# Patient Record
Sex: MALE | Race: BLACK OR AFRICAN AMERICAN | NOT HISPANIC OR LATINO | URBAN - METROPOLITAN AREA
[De-identification: names, ages, dates, MRNs, and addresses within clinical notes are randomized per-mention and may not be internally consistent; named-entity substitution may affect disease eponyms.]

---

## 2023-07-10 ASSESSMENT — ENCOUNTER SYMPTOMS
POLYPHAGIA: 0
FATIGUE: 0
HUNGER: 0
HEADACHES: 0
CONFUSION: 0
BLACKOUTS: 0
SEIZURES: 0
NERVOUS/ANXIOUS: 0
WEIGHT LOSS: 0
SWEATS: 0
TREMORS: 0
BLURRED VISION: 0
VISUAL CHANGE: 0
DIZZINESS: 0
SPEECH DIFFICULTY: 0
POLYDIPSIA: 0
WEAKNESS: 0

## 2023-07-11 ENCOUNTER — OFFICE VISIT (OUTPATIENT)
Dept: PRIMARY CARE | Facility: CLINIC | Age: 53
End: 2023-07-11
Payer: COMMERCIAL

## 2023-07-11 VITALS
HEIGHT: 66 IN | TEMPERATURE: 98 F | RESPIRATION RATE: 18 BRPM | HEART RATE: 86 BPM | BODY MASS INDEX: 45.06 KG/M2 | OXYGEN SATURATION: 95 % | SYSTOLIC BLOOD PRESSURE: 143 MMHG | DIASTOLIC BLOOD PRESSURE: 106 MMHG | WEIGHT: 280.4 LBS

## 2023-07-11 DIAGNOSIS — Z86.39 HX OF HYPERLIPIDEMIA: ICD-10-CM

## 2023-07-11 DIAGNOSIS — E11.9 TYPE 2 DIABETES MELLITUS WITHOUT COMPLICATION, WITHOUT LONG-TERM CURRENT USE OF INSULIN (MULTI): Primary | ICD-10-CM

## 2023-07-11 DIAGNOSIS — I10 HYPERTENSION, UNSPECIFIED TYPE: ICD-10-CM

## 2023-07-11 DIAGNOSIS — E66.01 MORBID OBESITY WITH BMI OF 45.0-49.9, ADULT (MULTI): ICD-10-CM

## 2023-07-11 PROCEDURE — 3080F DIAST BP >= 90 MM HG: CPT | Performed by: NURSE PRACTITIONER

## 2023-07-11 PROCEDURE — 3077F SYST BP >= 140 MM HG: CPT | Performed by: NURSE PRACTITIONER

## 2023-07-11 PROCEDURE — 4010F ACE/ARB THERAPY RXD/TAKEN: CPT | Performed by: NURSE PRACTITIONER

## 2023-07-11 PROCEDURE — 3008F BODY MASS INDEX DOCD: CPT | Performed by: NURSE PRACTITIONER

## 2023-07-11 PROCEDURE — 82043 UR ALBUMIN QUANTITATIVE: CPT

## 2023-07-11 PROCEDURE — 1036F TOBACCO NON-USER: CPT | Performed by: NURSE PRACTITIONER

## 2023-07-11 PROCEDURE — 99214 OFFICE O/P EST MOD 30 MIN: CPT | Performed by: NURSE PRACTITIONER

## 2023-07-11 PROCEDURE — 82570 ASSAY OF URINE CREATININE: CPT

## 2023-07-11 RX ORDER — LISINOPRIL 10 MG/1
10 TABLET ORAL DAILY
COMMUNITY
End: 2023-07-11 | Stop reason: SDUPTHER

## 2023-07-11 RX ORDER — CETIRIZINE HYDROCHLORIDE 10 MG/1
TABLET ORAL
COMMUNITY
Start: 2017-10-02

## 2023-07-11 RX ORDER — LISINOPRIL 10 MG/1
10 TABLET ORAL DAILY
Qty: 30 TABLET | Refills: 5 | Status: SHIPPED | OUTPATIENT
Start: 2023-07-11 | End: 2024-01-09 | Stop reason: SDUPTHER

## 2023-07-11 RX ORDER — ATORVASTATIN CALCIUM 10 MG/1
10 TABLET, FILM COATED ORAL DAILY
COMMUNITY
End: 2023-07-11 | Stop reason: SDUPTHER

## 2023-07-11 RX ORDER — GLIMEPIRIDE 2 MG/1
TABLET ORAL
Qty: 30 TABLET | Refills: 5 | Status: SHIPPED | OUTPATIENT
Start: 2023-07-11 | End: 2024-01-09 | Stop reason: SDUPTHER

## 2023-07-11 RX ORDER — ATORVASTATIN CALCIUM 10 MG/1
10 TABLET, FILM COATED ORAL DAILY
Qty: 30 TABLET | Refills: 5 | Status: SHIPPED | OUTPATIENT
Start: 2023-07-11 | End: 2024-01-09 | Stop reason: SDUPTHER

## 2023-07-11 RX ORDER — BLOOD-GLUCOSE METER
EACH MISCELLANEOUS
COMMUNITY
Start: 2021-03-08 | End: 2024-01-09 | Stop reason: SDUPTHER

## 2023-07-11 RX ORDER — GLIMEPIRIDE 2 MG/1
TABLET ORAL
COMMUNITY
End: 2023-07-11 | Stop reason: SDUPTHER

## 2023-07-11 RX ORDER — SILDENAFIL 100 MG/1
TABLET, FILM COATED ORAL
COMMUNITY
Start: 2017-07-14 | End: 2024-01-09 | Stop reason: SDUPTHER

## 2023-07-11 RX ORDER — METFORMIN HYDROCHLORIDE 1000 MG/1
1000 TABLET ORAL 2 TIMES DAILY
Qty: 60 TABLET | Refills: 5 | Status: SHIPPED | OUTPATIENT
Start: 2023-07-11 | End: 2024-01-09 | Stop reason: SDUPTHER

## 2023-07-11 RX ORDER — METFORMIN HYDROCHLORIDE 1000 MG/1
1000 TABLET ORAL 2 TIMES DAILY
COMMUNITY
End: 2023-07-11 | Stop reason: SDUPTHER

## 2023-07-11 ASSESSMENT — ENCOUNTER SYMPTOMS
NERVOUS/ANXIOUS: 0
APNEA: 0
SPEECH DIFFICULTY: 0
WEAKNESS: 0
COUGH: 0
FATIGUE: 0
HEADACHES: 0
SHORTNESS OF BREATH: 0
DIZZINESS: 0
POLYDIPSIA: 0
STRIDOR: 0
SEIZURES: 0
POLYPHAGIA: 0
PALPITATIONS: 0
CHEST TIGHTNESS: 0
CHOKING: 0
TREMORS: 0
NAUSEA: 0
VOMITING: 0
CONFUSION: 0
WHEEZING: 0

## 2023-07-11 NOTE — PROGRESS NOTES
"Subjective   Patient ID: Jer Silva is a 53 y.o. male who presents for Follow-up (6 months, refills).    HPI  Patient in office for follow-up on hypertension, hyperlipidemia, and diabetes. Needs blood pressure check, A1C recheck (fasted for labs), and medication refill (except Viagra). No other concerns.    AM fasting glucose levels at home: 90 to 110  BP: borderline; patient will start checking at home and call the office with outliers    Sees ophthalmologist regularly.   Monitors his feet for lesions.    The patient declines PSA recheck.     Review of Systems   Constitutional:  Negative for fatigue.   Respiratory:  Negative for apnea, cough, choking, chest tightness, shortness of breath, wheezing and stridor.    Cardiovascular:  Negative for chest pain, palpitations and leg swelling.   Gastrointestinal:  Negative for nausea and vomiting.   Endocrine: Negative for polydipsia, polyphagia and polyuria.   Skin:  Negative for pallor.   Neurological:  Negative for dizziness, tremors, seizures, speech difficulty, weakness and headaches.   Psychiatric/Behavioral:  Negative for confusion. The patient is not nervous/anxious.        Objective   BP (!) 143/106   Pulse 86   Temp 36.7 °C (98 °F)   Resp 18   Ht 1.676 m (5' 6\")   Wt 127 kg (280 lb 6.4 oz)   SpO2 95%   BMI 45.26 kg/m²     Repeat BP: 136/88 (manual)    Physical Exam  Constitutional:       Appearance: He is obese.   Eyes:      Conjunctiva/sclera: Conjunctivae normal.   Cardiovascular:      Rate and Rhythm: Normal rate and regular rhythm.      Pulses: Normal pulses.      Heart sounds: Normal heart sounds.   Pulmonary:      Effort: Pulmonary effort is normal.      Breath sounds: Normal breath sounds.   Musculoskeletal:      Right lower leg: No edema.      Left lower leg: No edema.   Neurological:      General: No focal deficit present.   Psychiatric:         Mood and Affect: Mood normal.         Judgment: Judgment normal.       Assessment/Plan     Exam " findings reviewed with patient. Medications and lab orders reviewed. Patient will keep monitoring blood sugars and will start monitoring blood pressures at home; he will call the office with outliers or abnormal trends. We will call with lab results and update the patient on the plan of care. Follow up in 6 months for a physical or earlier as needed.    Benefits of healthy lifestyle reviewed: low carbohydrates/fat/sugar diet; use lean white instead of red meet; use several servings of fruit and vegetables daily; use whole grain flour instead of white flour products; cook at home frequently instead of eating out; exercise 30-90 minutes 5 x/week.

## 2023-07-11 NOTE — PROGRESS NOTES
Answers submitted by the patient for this visit:  Diabetes Questionnaire (Submitted on 7/10/2023)  Chief Complaint: Diabetes problem  Diabetes type: type 2  MedicAlert ID: No  Disease duration: 6 Years  blurred vision: No  chest pain: No  fatigue: No  foot paresthesias: No  foot ulcerations: No  polydipsia: No  polyphagia: No  polyuria: No  visual change: No  weakness: No  weight loss: No  Symptom course: resolved  confusion: No  dizziness: No  headaches: No  hunger: No  mood changes: No  nervous/anxious: No  pallor: No  seizures: No  sleepiness: No  speech difficulty: No  sweats: No  tremors: No  blackouts: No  hospitalization: No  nocturnal hypoglycemia: No  required assistance: No  required glucagon: No  CVA: No  heart disease: No  impotence: Yes  nephropathy: No  peripheral neuropathy: No  PVD: No  retinopathy: No  CAD risks: obesity  Current treatments: diet, oral agent (monotherapy), oral agent (dual therapy)  Treatment compliance: all of the time  Home blood tests: 1-2 x per week  Monitoring compliance: excellent  Blood glucose trend: no change  breakfast time: 5-6 am  breakfast glucose level:   High score:   Weight trend: fluctuating minimally  Current diet: generally healthy  Meal planning: none  Exercise: intermittently  Dietitian visit: No  Eye exam current: No  Sees podiatrist: No

## 2023-07-12 LAB
ALBUMIN (MG/L) IN URINE: 18 MG/L
ALBUMIN/CREATININE (UG/MG) IN URINE: 5.7 UG/MG CRT (ref 0–30)
CREATININE (MG/DL) IN URINE: 318 MG/DL (ref 20–370)

## 2023-07-17 PROBLEM — L30.9 ECZEMA: Status: ACTIVE | Noted: 2023-07-17

## 2023-07-17 PROBLEM — I10 HTN (HYPERTENSION): Status: ACTIVE | Noted: 2023-07-17

## 2023-07-17 PROBLEM — D22.9 MULTIPLE NEVI: Status: ACTIVE | Noted: 2023-07-17

## 2023-07-17 PROBLEM — E78.00 HIGH CHOLESTEROL: Status: ACTIVE | Noted: 2023-07-17

## 2023-07-17 PROBLEM — D64.9 ANEMIA: Status: ACTIVE | Noted: 2023-06-02

## 2023-07-17 PROBLEM — E11.9 DIABETES MELLITUS, TYPE 2 (MULTI): Status: ACTIVE | Noted: 2017-10-02

## 2023-07-17 PROBLEM — D56.9 THALASSEMIA: Status: ACTIVE | Noted: 2023-06-02

## 2023-07-17 PROBLEM — N52.9 ERECTILE DYSFUNCTION: Status: ACTIVE | Noted: 2023-07-17

## 2024-01-03 ASSESSMENT — PROMIS GLOBAL HEALTH SCALE
CARRYOUT_PHYSICAL_ACTIVITIES: COMPLETELY
CARRYOUT_SOCIAL_ACTIVITIES: EXCELLENT
RATE_AVERAGE_PAIN: 0
RATE_QUALITY_OF_LIFE: VERY GOOD
RATE_AVERAGE_FATIGUE: MILD
RATE_GENERAL_HEALTH: GOOD
RATE_SOCIAL_SATISFACTION: EXCELLENT
RATE_MENTAL_HEALTH: VERY GOOD
RATE_PHYSICAL_HEALTH: GOOD
EMOTIONAL_PROBLEMS: RARELY

## 2024-01-05 ENCOUNTER — APPOINTMENT (OUTPATIENT)
Dept: PRIMARY CARE | Facility: CLINIC | Age: 54
End: 2024-01-05
Payer: COMMERCIAL

## 2024-01-08 ASSESSMENT — PROMIS GLOBAL HEALTH SCALE
RATE_AVERAGE_FATIGUE: MILD
CARRYOUT_PHYSICAL_ACTIVITIES: COMPLETELY
RATE_MENTAL_HEALTH: VERY GOOD
RATE_SOCIAL_SATISFACTION: VERY GOOD
EMOTIONAL_PROBLEMS: NEVER
RATE_PHYSICAL_HEALTH: GOOD
RATE_AVERAGE_PAIN: 0
CARRYOUT_SOCIAL_ACTIVITIES: VERY GOOD
RATE_GENERAL_HEALTH: GOOD
RATE_QUALITY_OF_LIFE: VERY GOOD

## 2024-01-09 ENCOUNTER — OFFICE VISIT (OUTPATIENT)
Dept: PRIMARY CARE | Facility: CLINIC | Age: 54
End: 2024-01-09
Payer: COMMERCIAL

## 2024-01-09 VITALS
OXYGEN SATURATION: 96 % | WEIGHT: 273 LBS | TEMPERATURE: 97.3 F | BODY MASS INDEX: 42.85 KG/M2 | HEIGHT: 67 IN | DIASTOLIC BLOOD PRESSURE: 91 MMHG | RESPIRATION RATE: 18 BRPM | SYSTOLIC BLOOD PRESSURE: 130 MMHG | HEART RATE: 93 BPM

## 2024-01-09 DIAGNOSIS — I10 HYPERTENSION, UNSPECIFIED TYPE: ICD-10-CM

## 2024-01-09 DIAGNOSIS — D56.9 THALASSEMIA, UNSPECIFIED TYPE: ICD-10-CM

## 2024-01-09 DIAGNOSIS — Z12.5 SCREENING FOR PROSTATE CANCER: ICD-10-CM

## 2024-01-09 DIAGNOSIS — E11.9 TYPE 2 DIABETES MELLITUS WITHOUT COMPLICATION, WITHOUT LONG-TERM CURRENT USE OF INSULIN (MULTI): ICD-10-CM

## 2024-01-09 DIAGNOSIS — D64.9 ANEMIA, UNSPECIFIED TYPE: ICD-10-CM

## 2024-01-09 DIAGNOSIS — Z00.00 HEALTHCARE MAINTENANCE: Primary | ICD-10-CM

## 2024-01-09 DIAGNOSIS — D22.9 MULTIPLE NEVI: ICD-10-CM

## 2024-01-09 DIAGNOSIS — Z86.39 HX OF HYPERLIPIDEMIA: ICD-10-CM

## 2024-01-09 DIAGNOSIS — N52.9 ERECTILE DYSFUNCTION, UNSPECIFIED ERECTILE DYSFUNCTION TYPE: ICD-10-CM

## 2024-01-09 PROCEDURE — 3080F DIAST BP >= 90 MM HG: CPT | Performed by: NURSE PRACTITIONER

## 2024-01-09 PROCEDURE — 82728 ASSAY OF FERRITIN: CPT

## 2024-01-09 PROCEDURE — 80061 LIPID PANEL: CPT

## 2024-01-09 PROCEDURE — 1036F TOBACCO NON-USER: CPT | Performed by: NURSE PRACTITIONER

## 2024-01-09 PROCEDURE — 84153 ASSAY OF PSA TOTAL: CPT

## 2024-01-09 PROCEDURE — 82607 VITAMIN B-12: CPT

## 2024-01-09 PROCEDURE — 84443 ASSAY THYROID STIM HORMONE: CPT

## 2024-01-09 PROCEDURE — 3075F SYST BP GE 130 - 139MM HG: CPT | Performed by: NURSE PRACTITIONER

## 2024-01-09 PROCEDURE — 3008F BODY MASS INDEX DOCD: CPT | Performed by: NURSE PRACTITIONER

## 2024-01-09 PROCEDURE — 80053 COMPREHEN METABOLIC PANEL: CPT

## 2024-01-09 PROCEDURE — 83550 IRON BINDING TEST: CPT

## 2024-01-09 PROCEDURE — 83036 HEMOGLOBIN GLYCOSYLATED A1C: CPT

## 2024-01-09 PROCEDURE — 99396 PREV VISIT EST AGE 40-64: CPT | Performed by: NURSE PRACTITIONER

## 2024-01-09 PROCEDURE — 83540 ASSAY OF IRON: CPT

## 2024-01-09 PROCEDURE — 36415 COLL VENOUS BLD VENIPUNCTURE: CPT

## 2024-01-09 PROCEDURE — 4010F ACE/ARB THERAPY RXD/TAKEN: CPT | Performed by: NURSE PRACTITIONER

## 2024-01-09 PROCEDURE — 85025 COMPLETE CBC W/AUTO DIFF WBC: CPT

## 2024-01-09 RX ORDER — GLIMEPIRIDE 2 MG/1
TABLET ORAL
Qty: 30 TABLET | Refills: 5 | Status: SHIPPED | OUTPATIENT
Start: 2024-01-09

## 2024-01-09 RX ORDER — SILDENAFIL 100 MG/1
TABLET, FILM COATED ORAL
Qty: 10 TABLET | Refills: 5 | Status: SHIPPED | OUTPATIENT
Start: 2024-01-09

## 2024-01-09 RX ORDER — LISINOPRIL 10 MG/1
10 TABLET ORAL DAILY
Qty: 30 TABLET | Refills: 5 | Status: SHIPPED | OUTPATIENT
Start: 2024-01-09 | End: 2025-01-08

## 2024-01-09 RX ORDER — SILDENAFIL 100 MG/1
TABLET, FILM COATED ORAL
Qty: 10 TABLET | Status: CANCELLED | OUTPATIENT
Start: 2024-01-09

## 2024-01-09 RX ORDER — BLOOD-GLUCOSE METER
EACH MISCELLANEOUS
Qty: 100 STRIP | Refills: 3 | Status: SHIPPED | OUTPATIENT
Start: 2024-01-09

## 2024-01-09 RX ORDER — ATORVASTATIN CALCIUM 10 MG/1
10 TABLET, FILM COATED ORAL DAILY
Qty: 30 TABLET | Refills: 5 | Status: SHIPPED | OUTPATIENT
Start: 2024-01-09 | End: 2025-01-08

## 2024-01-09 RX ORDER — METFORMIN HYDROCHLORIDE 1000 MG/1
1000 TABLET ORAL 2 TIMES DAILY
Qty: 60 TABLET | Refills: 5 | Status: SHIPPED | OUTPATIENT
Start: 2024-01-09 | End: 2025-01-08

## 2024-01-09 NOTE — PROGRESS NOTES
Subjective   Patient ID: Jer Silva is a 53 y.o. male who presents for Annual Exam.    HPI   Patient in office for a physical. Patient describes health as good.      Lifestyle: [employer]. Reports healthy diet. Patient does not smoke; does not drink alcohol. No drug use.      Screens and immunizations:  Colon Cancer Screening: Reviewed and current. 8/2021; order Cologuard in 8/2024  Prostate Cancer Screening: order PSA  Vision and hearing: Reviewed and current.     Immunizations:  TDaP: 2021  Pneumonia: 2017  Hep B:  vaccinated (per patient report)  Flu: patient will get booster at pharmacy  COVID: patient will get booster at pharmacy   Shingles: patient will get booster at pharmacy    Concerns:  -DM 2; controlled  -HTN; controlled  -Elevated lipids; check labs  -Multiple nevi; patient follows up with dermatology  -BMI: 43; the patient recently restarted a daily exercise routine  -Erectile dysfunction; responds well to Sildenafil  -We will reevaluate risks/benefits of Aspirin use after lab review  -Hx of Thalassemia and mild anemia; check CBC    Review of Systems  Constitutional:  Negative for fatigue, activity change, appetite change, chills, diaphoresis, fever and unexpected weight change. HENT:  Negative for congestion, dental problem, drooling, ear discharge, ear pain, facial swelling, hearing loss, mouth sores, nosebleeds, postnasal drip, rhinorrhea, sinus pressure, sinus pain, sneezing, sore throat, tinnitus, trouble swallowing and voice change. Eyes:  Negative for photophobia, pain, discharge, redness, itching and visual disturbance.   Respiratory:  Negative for apnea, cough, choking, chest tightness, shortness of breath, wheezing and stridor.    Cardiovascular:  Negative for chest pain, palpitations and leg swelling.   Gastrointestinal:  Negative for abdominal distention, abdominal pain, anal bleeding, blood in stool, constipation, diarrhea, nausea, rectal pain and vomiting.   Endocrine: Negative for  "cold intolerance, heat intolerance, polydipsia, polyphagia and polyuria.   Genitourinary:  Negative for decreased urine volume, difficulty urinating, dysuria, enuresis, flank pain, frequency, and urgency.   Musculoskeletal:  Negative for arthralgias, gait problem, joint swelling, myalgias, neck pain and neck stiffness.   Skin:  Negative for color change, pallor, rash and wound.   Neurological:  Negative for dizziness, tremors, seizures, syncope, facial asymmetry, speech difficulty, weakness, light-headedness, numbness and headaches.   Hematological:  Negative for adenopathy. Does not bruise/bleed easily.   Psychiatric/Behavioral:  Negative for depression, agitation, behavioral problems, confusion, hyperactivity, sleep disturbance, hallucinations, self-injury, and suicidal ideas. Negative for anxiety.      Objective   BP (!) 130/91   Pulse 93   Temp 36.3 °C (97.3 °F)   Resp 18   Ht 1.689 m (5' 6.5\")   Wt 124 kg (273 lb)   SpO2 96%   BMI 43.40 kg/m²     Physical Exam  Constitutional:       Appearance: Normal appearance. Obese.   Neurological     Mental Status: He is alert.   HENT:   Right ear: Cerumen impaction.  Left ear: normal: TM and ear canal visualized.  Head: Normocephalic.      Nose: Nose normal.      Mouth/Throat:      Mouth: Mucous membranes are moist.      Pharynx: Oropharynx is clear.   Eyes:      General: No scleral icterus.        Right eye: No discharge.         Left eye: No discharge.      Conjunctiva/sclera: Conjunctivae normal.      Pupils: Pupils are equal, round, and reactive to light.   Neck:      Vascular: No carotid bruit.   Cardiovascular:      Rate and Rhythm: Normal rate and regular rhythm.      Pulses: Normal pulses.      Heart sounds: Normal heart sounds.   Pulmonary:      Effort: Pulmonary effort is normal.      Breath sounds: Normal breath sounds.   Abdominal:      General: Abdomen is flat. Bowel sounds are normal. There is no distension.      Palpations: Abdomen is soft. There is " no mass.      Tenderness: There is no abdominal tenderness. There is no guarding or rebound.      Hernia: No hernia is present.   Musculoskeletal:         General: No swelling, tenderness, deformity or signs of injury.      Cervical back: Normal range of motion and neck supple. No rigidity or tenderness.      Right lower leg: No edema.      Left lower leg: No edema.     Normal ROM.  Lymphadenopathy:      Cervical: No cervical adenopathy.   Skin:     General: Skin is warm.      Coloration: Skin is not jaundiced or pale.      Findings: No bruising, erythema, lesion.     No rash. Multiple nevi.  Neurological:      General: No focal deficit present.      Mental Status: She is alert and oriented to person, place, and time.      Cranial Nerves: No cranial nerve deficit.      Sensory: No sensory deficit.      Motor: No weakness.      Coordination: Coordination normal.      Gait: Gait normal.   Psychiatric:         Mood and Affect: Mood normal.         Behavior: Behavior normal.         Thought Content: Thought content normal.    Judgment: Judgment normal.      Assessment/Plan     Exam findings reviewed with patient.  Health screens, lab orders,  immunizations, medications,   and specialty provider follow-  ups discussed. Benefits of healthy lifestyle, diet, and exercise reviewed. Follow-up in 6 months for a routine visit or earlier as needed.

## 2024-01-10 ENCOUNTER — TELEPHONE (OUTPATIENT)
Dept: PRIMARY CARE | Facility: CLINIC | Age: 54
End: 2024-01-10
Payer: COMMERCIAL

## 2024-01-10 DIAGNOSIS — Z86.39 HX OF HYPERLIPIDEMIA: ICD-10-CM

## 2024-01-10 DIAGNOSIS — E11.9 TYPE 2 DIABETES MELLITUS WITHOUT COMPLICATION, WITHOUT LONG-TERM CURRENT USE OF INSULIN (MULTI): Primary | ICD-10-CM

## 2024-01-10 DIAGNOSIS — I10 HYPERTENSION, UNSPECIFIED TYPE: ICD-10-CM

## 2024-01-10 DIAGNOSIS — D64.9 ANEMIA, UNSPECIFIED TYPE: Primary | ICD-10-CM

## 2024-01-10 DIAGNOSIS — E11.9 TYPE 2 DIABETES MELLITUS WITHOUT COMPLICATION, WITHOUT LONG-TERM CURRENT USE OF INSULIN (MULTI): ICD-10-CM

## 2024-01-10 LAB
ALBUMIN SERPL BCP-MCNC: 4.3 G/DL (ref 3.4–5)
ALP SERPL-CCNC: 76 U/L (ref 33–120)
ALT SERPL W P-5'-P-CCNC: 26 U/L (ref 10–52)
ANION GAP SERPL CALC-SCNC: 13 MMOL/L (ref 10–20)
AST SERPL W P-5'-P-CCNC: 19 U/L (ref 9–39)
BASOPHILS # BLD AUTO: 0.03 X10*3/UL (ref 0–0.1)
BASOPHILS NFR BLD AUTO: 0.5 %
BILIRUB SERPL-MCNC: 0.3 MG/DL (ref 0–1.2)
BUN SERPL-MCNC: 13 MG/DL (ref 6–23)
CALCIUM SERPL-MCNC: 9.8 MG/DL (ref 8.6–10.6)
CHLORIDE SERPL-SCNC: 102 MMOL/L (ref 98–107)
CHOLEST SERPL-MCNC: 121 MG/DL (ref 0–199)
CHOLESTEROL/HDL RATIO: 3
CO2 SERPL-SCNC: 29 MMOL/L (ref 21–32)
CREAT SERPL-MCNC: 1.08 MG/DL (ref 0.5–1.3)
EGFRCR SERPLBLD CKD-EPI 2021: 82 ML/MIN/1.73M*2
EOSINOPHIL # BLD AUTO: 0.07 X10*3/UL (ref 0–0.7)
EOSINOPHIL NFR BLD AUTO: 1.1 %
ERYTHROCYTE [DISTWIDTH] IN BLOOD BY AUTOMATED COUNT: 15.6 % (ref 11.5–14.5)
EST. AVERAGE GLUCOSE BLD GHB EST-MCNC: 134 MG/DL
FERRITIN SERPL-MCNC: 118 NG/ML (ref 20–300)
GLUCOSE SERPL-MCNC: 94 MG/DL (ref 74–99)
HBA1C MFR BLD: 6.3 %
HCT VFR BLD AUTO: 41.5 % (ref 41–52)
HDLC SERPL-MCNC: 39.8 MG/DL
HGB BLD-MCNC: 13 G/DL (ref 13.5–17.5)
IMM GRANULOCYTES # BLD AUTO: 0.01 X10*3/UL (ref 0–0.7)
IMM GRANULOCYTES NFR BLD AUTO: 0.2 % (ref 0–0.9)
IRON SATN MFR SERPL: 25 % (ref 25–45)
IRON SERPL-MCNC: 78 UG/DL (ref 35–150)
LDLC SERPL CALC-MCNC: 71 MG/DL
LYMPHOCYTES # BLD AUTO: 2.73 X10*3/UL (ref 1.2–4.8)
LYMPHOCYTES NFR BLD AUTO: 43.3 %
MCH RBC QN AUTO: 22.3 PG (ref 26–34)
MCHC RBC AUTO-ENTMCNC: 31.3 G/DL (ref 32–36)
MCV RBC AUTO: 71 FL (ref 80–100)
MONOCYTES # BLD AUTO: 0.34 X10*3/UL (ref 0.1–1)
MONOCYTES NFR BLD AUTO: 5.4 %
NEUTROPHILS # BLD AUTO: 3.13 X10*3/UL (ref 1.2–7.7)
NEUTROPHILS NFR BLD AUTO: 49.5 %
NON HDL CHOLESTEROL: 81 MG/DL (ref 0–149)
NRBC BLD-RTO: 0 /100 WBCS (ref 0–0)
PLATELET # BLD AUTO: 300 X10*3/UL (ref 150–450)
POTASSIUM SERPL-SCNC: 4.5 MMOL/L (ref 3.5–5.3)
PROT SERPL-MCNC: 7.6 G/DL (ref 6.4–8.2)
PSA SERPL-MCNC: 2.65 NG/ML
RBC # BLD AUTO: 5.83 X10*6/UL (ref 4.5–5.9)
SODIUM SERPL-SCNC: 139 MMOL/L (ref 136–145)
TIBC SERPL-MCNC: 315 UG/DL (ref 240–445)
TRIGL SERPL-MCNC: 52 MG/DL (ref 0–149)
TSH SERPL-ACNC: 1.35 MIU/L (ref 0.44–3.98)
UIBC SERPL-MCNC: 237 UG/DL (ref 110–370)
VIT B12 SERPL-MCNC: 481 PG/ML (ref 211–911)
VLDL: 10 MG/DL (ref 0–40)
WBC # BLD AUTO: 6.3 X10*3/UL (ref 4.4–11.3)

## 2024-01-10 RX ORDER — METFORMIN HYDROCHLORIDE 1000 MG/1
1000 TABLET ORAL 2 TIMES DAILY
Qty: 60 TABLET | Refills: 5 | OUTPATIENT
Start: 2024-01-10 | End: 2025-01-09

## 2024-01-10 RX ORDER — GLIMEPIRIDE 2 MG/1
TABLET ORAL
Qty: 30 TABLET | Refills: 5 | OUTPATIENT
Start: 2024-01-10

## 2024-01-10 RX ORDER — ATORVASTATIN CALCIUM 10 MG/1
10 TABLET, FILM COATED ORAL DAILY
Qty: 30 TABLET | Refills: 5 | OUTPATIENT
Start: 2024-01-10 | End: 2025-01-09

## 2024-01-10 RX ORDER — LISINOPRIL 10 MG/1
10 TABLET ORAL DAILY
Qty: 30 TABLET | Refills: 5 | OUTPATIENT
Start: 2024-01-10 | End: 2025-01-09

## 2024-01-10 NOTE — TELEPHONE ENCOUNTER
Jihan from [facility] called and asked for refills of the following:    Lisinopril 10 mg 1 tablet daily  Metformin 1000 mg BID  Lipitor 10 mg 1 tablet daily  Atorvastatin 10 mg 1 tablet daily

## 2024-01-10 NOTE — RESULT ENCOUNTER NOTE
Please call the patient. Stable labs. The patient remains slightly anemic. Please ask the patient if he has seen hematology and let me know. Lipids are normal and diabetes is well controlled. I suggest a CT Calcium scan to check for cardiac plaque to determine if the patient should be on Aspirin. Please let me know if the patient agrees, and I will order it. Recheck A1C in 6 months, other labs in 1 year.

## 2024-01-10 NOTE — TELEPHONE ENCOUNTER
I spoke with the patient and he said that he does see a Hematologist at [facility] 2-3 times a year.  He said that she told him that the issue he has is genetic and his Red Blood Cells are smaller than normal and more pale in color.  She said that there isn't anything that can be done for that.    He would like for you to order the CT Calcium Score test.  He said once it is ordered he will schedule an appointment to have it done.  Can you please place the order?  Thank you

## 2024-01-12 ENCOUNTER — APPOINTMENT (OUTPATIENT)
Dept: PRIMARY CARE | Facility: CLINIC | Age: 54
End: 2024-01-12
Payer: COMMERCIAL

## 2024-01-30 ENCOUNTER — HOSPITAL ENCOUNTER (OUTPATIENT)
Dept: RADIOLOGY | Facility: CLINIC | Age: 54
Discharge: HOME | End: 2024-01-30
Payer: COMMERCIAL

## 2024-01-30 DIAGNOSIS — E11.9 TYPE 2 DIABETES MELLITUS WITHOUT COMPLICATION, WITHOUT LONG-TERM CURRENT USE OF INSULIN (MULTI): ICD-10-CM

## 2024-01-30 DIAGNOSIS — I25.10 MILD CORONARY ARTERY DISEASE: ICD-10-CM

## 2024-01-30 DIAGNOSIS — I10 HYPERTENSION, UNSPECIFIED TYPE: ICD-10-CM

## 2024-01-30 DIAGNOSIS — Z86.39 HX OF HYPERLIPIDEMIA: ICD-10-CM

## 2024-01-30 DIAGNOSIS — Z86.39 HX OF HYPERLIPIDEMIA: Primary | ICD-10-CM

## 2024-01-30 PROCEDURE — 75571 CT HRT W/O DYE W/CA TEST: CPT

## 2024-01-30 NOTE — RESULT ENCOUNTER NOTE
There is evidence of very mild plaque in 1 of the coronary vessels. Will refer to cardiology for a consult. [facility]ocate healthy diet and exercise. No medication changes. TY

## 2024-02-06 PROBLEM — R71.8 ABNORMAL RED BLOOD CELLS: Status: ACTIVE | Noted: 2024-02-06

## 2024-02-06 PROBLEM — Z86.39 HISTORY OF ELEVATED LIPIDS: Status: ACTIVE | Noted: 2024-02-06

## 2024-02-19 ENCOUNTER — APPOINTMENT (OUTPATIENT)
Dept: CARDIOLOGY | Facility: CLINIC | Age: 54
End: 2024-02-19
Payer: COMMERCIAL

## 2024-03-14 ENCOUNTER — OFFICE VISIT (OUTPATIENT)
Dept: CARDIOLOGY | Facility: CLINIC | Age: 54
End: 2024-03-14
Payer: COMMERCIAL

## 2024-03-14 VITALS
DIASTOLIC BLOOD PRESSURE: 78 MMHG | SYSTOLIC BLOOD PRESSURE: 132 MMHG | HEIGHT: 67 IN | OXYGEN SATURATION: 96 % | WEIGHT: 267 LBS | BODY MASS INDEX: 41.91 KG/M2 | HEART RATE: 97 BPM

## 2024-03-14 DIAGNOSIS — E11.9 TYPE 2 DIABETES MELLITUS WITHOUT COMPLICATION, WITHOUT LONG-TERM CURRENT USE OF INSULIN (MULTI): ICD-10-CM

## 2024-03-14 DIAGNOSIS — Z86.39 HX OF HYPERLIPIDEMIA: ICD-10-CM

## 2024-03-14 DIAGNOSIS — I25.10 MILD CORONARY ARTERY DISEASE: ICD-10-CM

## 2024-03-14 DIAGNOSIS — E78.5 DYSLIPIDEMIA: ICD-10-CM

## 2024-03-14 DIAGNOSIS — R93.1 AGATSTON CORONARY ARTERY CALCIUM SCORE LESS THAN 100: Primary | ICD-10-CM

## 2024-03-14 DIAGNOSIS — I10 HYPERTENSION, UNSPECIFIED TYPE: ICD-10-CM

## 2024-03-14 PROBLEM — E78.00 HIGH CHOLESTEROL: Status: RESOLVED | Noted: 2023-07-17 | Resolved: 2024-03-14

## 2024-03-14 PROCEDURE — 3048F LDL-C <100 MG/DL: CPT | Performed by: INTERNAL MEDICINE

## 2024-03-14 PROCEDURE — 3044F HG A1C LEVEL LT 7.0%: CPT | Performed by: INTERNAL MEDICINE

## 2024-03-14 PROCEDURE — 1036F TOBACCO NON-USER: CPT | Performed by: INTERNAL MEDICINE

## 2024-03-14 PROCEDURE — 3008F BODY MASS INDEX DOCD: CPT | Performed by: INTERNAL MEDICINE

## 2024-03-14 PROCEDURE — 3075F SYST BP GE 130 - 139MM HG: CPT | Performed by: INTERNAL MEDICINE

## 2024-03-14 PROCEDURE — 4010F ACE/ARB THERAPY RXD/TAKEN: CPT | Performed by: INTERNAL MEDICINE

## 2024-03-14 PROCEDURE — 99204 OFFICE O/P NEW MOD 45 MIN: CPT | Performed by: INTERNAL MEDICINE

## 2024-03-14 PROCEDURE — 93000 ELECTROCARDIOGRAM COMPLETE: CPT | Performed by: INTERNAL MEDICINE

## 2024-03-14 PROCEDURE — 3078F DIAST BP <80 MM HG: CPT | Performed by: INTERNAL MEDICINE

## 2024-03-14 ASSESSMENT — PAIN SCALES - GENERAL: PAINLEVEL: 0-NO PAIN

## 2024-03-14 ASSESSMENT — ENCOUNTER SYMPTOMS
OCCASIONAL FEELINGS OF UNSTEADINESS: 0
DEPRESSION: 0
LOSS OF SENSATION IN FEET: 0

## 2024-03-14 NOTE — PROGRESS NOTES
Arterial Line      Patient reassessed immediately prior to procedure    Patient location during procedure: holding area  Start time: 3/15/2022 8:43 AM  Stop Time:3/15/2022 8:50 AM       Line placed for hemodynamic monitoring, ABGs/Labs/ISTAT and MD/Surgeon request.  Performed By   Anesthesiologist: Amador Steiner MD  Preanesthetic Checklist  Completed: patient identified, IV checked, site marked, risks and benefits discussed, surgical consent, monitors and equipment checked, pre-op evaluation and timeout performed  Arterial Line Prep   Sterile Tech: cap, gloves, mask and sterile barriers  Prep: ChloraPrep  Patient monitoring: blood pressure monitoring, continuous pulse oximetry and EKG  Arterial Line Procedure   Laterality:left  Location:  radial artery  Catheter size: 20 G   Guidance: ultrasound guided  PROCEDURE NOTE/ULTRASOUND INTERPRETATION.  Using ultrasound guidance the potential vascular sites for insertion of the catheter were visualized to determine the patency of the vessel to be used for vascular access.  After selecting the appropriate site for insertion, the needle was visualized under ultrasound being inserted into the radial artery, followed by ultrasound confirmation of wire and catheter placement. There were no abnormalities seen on ultrasound; an image was taken; and the patient tolerated the procedure with no complications.   Number of attempts: 1  Successful placement: yes  Post Assessment   Dressing Type: wrist guard applied, occlusive dressing applied and secured with tape.   Complications no  Circ/Move/Sens Assessment: normal and unchanged.   Patient Tolerance: patient tolerated the procedure well with no apparent complications             Chief Complaint:   No chief complaint on file.     History Of Present Illness:    Jer Silva is a 53 y.o. male with a history of mild coronary artery calcification, hypertension and dyslipidemia here for cardiovascular evaluation.    He is doing well.  He denies any exertional chest pain or shortness of breath.  He denies any palpitations, lightheadedness or loss of consciousness.  He has no leg swelling.    He saw his PCP who ordered a CT calcium score.  He is here to talk about those results.    He has no personal history of coronary artery disease.    Is a [facility].  He tells me that he knew my cousin Kaylee before she was a .    CT calcium score 1/30/2024: Total score 8.5.     Past Medical History:  He has a past medical history of Allergic (As a child), Asthma (As a child), Diabetes mellitus (CMS/[facility]) (2017), Eczema (As a child), Personal history of other specified conditions (01/03/2017), and Polydipsia (01/03/2017).    Past Surgical History:  He has a past surgical history that includes Other surgical history (01/03/2017) and Circumcision, primary (1969).      Social History:  He reports that he has never smoked. He has never used smokeless tobacco. He reports current alcohol use. He reports that he does not use drugs.    Family History:  Family History   Problem Relation Name Age of Onset    Diabetes Brother Megha Silva         Allergies:  Animal dander, Aspirin, Grass pollen, Lactose, Penicillins, and Ragweed pollen    Outpatient Medications:  Current Outpatient Medications   Medication Instructions    atorvastatin (LIPITOR) 10 mg, oral, Daily    blood sugar diagnostic (OneTouch Verio test strips) strip CHECK BLOOD SUGAR DAILY in the morning AND AS NEEDED    cetirizine (ZyrTEC) 10 mg tablet     glimepiride (Amaryl) 2 mg tablet TAKE ONE TABLET BY MOUTH EVERY DAY IN THE MORNING WITH BREAKFAST    lisinopril 10 mg, oral, Daily    metFORMIN (GLUCOPHAGE) 1,000 mg, oral, 2 times  "daily    sildenafil (Viagra) 100 mg tablet Take 1 tablet by mouth 1 hour before needed       Last Recorded Vitals:  Visit Vitals  /78 (BP Location: Left arm, Patient Position: Sitting)   Pulse 97   Ht 1.702 m (5' 7\")   Wt 121 kg (267 lb)   SpO2 96%   BMI 41.82 kg/m²   Smoking Status Never   BSA 2.39 m²      LASTWT(3):   Wt Readings from Last 3 Encounters:   03/14/24 121 kg (267 lb)   01/09/24 124 kg (273 lb)   07/11/23 127 kg (280 lb 6.4 oz)       Physical Exam:  In general: alert and in no acute distress.   HEENT: Carotid upstrokes normal with no bruits. JVP is normal.   Pulmonary: Clear to auscultation bilaterally.  Cardiovascular: S1,S2, regular. No appreciable murmurs, rubs or gallops.   Lower extremities: Warm. 2+ distal pulses. No edema.     Last Labs:  CBC -  Recent Labs     01/09/24 1225 01/07/22 0906 04/06/18  1429   WBC 6.3 6.2 6.2   HGB 13.0* 12.4* 12.6*   HCT 41.5 42.2 40.7*    317 300   MCV 71* 75* 71*       CMP -  Recent Labs     01/09/24 1225 01/07/22  0906 04/06/18  1429    140 140   K 4.5 4.4 4.3    105 102   CO2 29 27 28   ANIONGAP 13 12 14   BUN 13 10 9   CREATININE 1.08 1.00 1.01   EGFR 82  --   --      Recent Labs     01/09/24 1225 01/07/22  0906 04/06/18  1429   ALBUMIN 4.3 4.2 4.4   ALKPHOS 76 89 88   ALT 26 19 21   AST 19 16 17   BILITOT 0.3 0.3 0.4       LIPID PANEL -   Recent Labs     01/09/24  1225 01/07/22  0906 04/06/18  1429   CHOL 121 116 103   LDLCALC 71  --   --    LDLF  --  68 60   HDL 39.8 38.5* 32.6*   TRIG 52 46 51       Recent Labs     01/09/24 1225 04/06/18  1429 11/03/17  0000   HGBA1C 6.3* 6.0 6.3           Assessment/Plan   1) coronary artery calcification: Very mild coronary artery calcification.  ECG shows left axis deviation but is otherwise normal.  He has no exertional chest pain or shortness of breath.  I explained that this is a very low risk calcium score.    At this time I see no need for additional workup.  Would continue with " lifestyle and risk factor modification as set out by his PCP.    2) dyslipidemia: LDL well-controlled.  No changes needed.    3) hypertension: Blood pressure controlled    4) follow-up: As needed      Juventino Tirado MD

## 2024-04-26 ENCOUNTER — OFFICE VISIT (OUTPATIENT)
Dept: URGENT CARE | Facility: CLINIC | Age: 54
End: 2024-04-26
Payer: COMMERCIAL

## 2024-04-26 VITALS
RESPIRATION RATE: 18 BRPM | DIASTOLIC BLOOD PRESSURE: 92 MMHG | TEMPERATURE: 97.2 F | SYSTOLIC BLOOD PRESSURE: 141 MMHG | HEART RATE: 87 BPM

## 2024-04-26 DIAGNOSIS — H92.01 ACUTE OTALGIA, RIGHT: Primary | ICD-10-CM

## 2024-04-26 PROCEDURE — 3044F HG A1C LEVEL LT 7.0%: CPT | Performed by: PHYSICIAN ASSISTANT

## 2024-04-26 PROCEDURE — 3080F DIAST BP >= 90 MM HG: CPT | Performed by: PHYSICIAN ASSISTANT

## 2024-04-26 PROCEDURE — 1036F TOBACCO NON-USER: CPT | Performed by: PHYSICIAN ASSISTANT

## 2024-04-26 PROCEDURE — 3048F LDL-C <100 MG/DL: CPT | Performed by: PHYSICIAN ASSISTANT

## 2024-04-26 PROCEDURE — 4010F ACE/ARB THERAPY RXD/TAKEN: CPT | Performed by: PHYSICIAN ASSISTANT

## 2024-04-26 PROCEDURE — 3008F BODY MASS INDEX DOCD: CPT | Performed by: PHYSICIAN ASSISTANT

## 2024-04-26 PROCEDURE — 3077F SYST BP >= 140 MM HG: CPT | Performed by: PHYSICIAN ASSISTANT

## 2024-04-26 PROCEDURE — 99203 OFFICE O/P NEW LOW 30 MIN: CPT | Performed by: PHYSICIAN ASSISTANT

## 2024-04-26 ASSESSMENT — PAIN SCALES - GENERAL: PAINLEVEL: 6

## 2024-04-26 ASSESSMENT — ENCOUNTER SYMPTOMS
CARDIOVASCULAR NEGATIVE: 1
GASTROINTESTINAL NEGATIVE: 1
EYES NEGATIVE: 1
COUGH: 0
ENDOCRINE NEGATIVE: 1
NEUROLOGICAL NEGATIVE: 1
PSYCHIATRIC NEGATIVE: 1
HEMATOLOGIC/LYMPHATIC NEGATIVE: 1
ALLERGIC/IMMUNOLOGIC NEGATIVE: 1
FEVER: 0
MUSCULOSKELETAL NEGATIVE: 1
SORE THROAT: 0

## 2024-04-26 NOTE — PATIENT INSTRUCTIONS
Zyrtec or claritin as directed  Pcp follow up this week if not improving or worsening  ER visit anytime 24/7 for acute worsening or changing condition

## 2024-04-26 NOTE — PROGRESS NOTES
Subjective   Patient ID: Jer Silva is a 53 y.o. male.      History provided by:  Patient   used: No    Earache   Pertinent negatives include no coughing, ear discharge or sore throat.   This is a 53 yr old male here for right ear pain x 3-4 days. No ear drainage, URI sxs, cough, fever or sore throat.     Review of Systems   Constitutional:  Negative for fever.   HENT:  Positive for ear pain. Negative for congestion, ear discharge and sore throat.    Eyes: Negative.    Respiratory:  Negative for cough.    Cardiovascular: Negative.    Gastrointestinal: Negative.    Endocrine: Negative.    Genitourinary: Negative.    Musculoskeletal: Negative.    Skin: Negative.    Allergic/Immunologic: Negative.    Neurological: Negative.    Hematological: Negative.    Psychiatric/Behavioral: Negative.     All other systems reviewed and are negative.  BP (!) 141/92   Pulse 87   Temp 36.2 °C (97.2 °F)   Resp 18     Objective   Physical Exam  Vitals and nursing note reviewed.   Constitutional:       Appearance: Normal appearance.   HENT:      Head: Normocephalic and atraumatic.      Right Ear: Tympanic membrane and ear canal normal.      Left Ear: Tympanic membrane and ear canal normal.      Ears:      Comments: No TMJ click right ear     Mouth/Throat:      Mouth: Mucous membranes are moist.      Pharynx: Oropharynx is clear.   Cardiovascular:      Rate and Rhythm: Normal rate and regular rhythm.   Pulmonary:      Effort: Pulmonary effort is normal.      Breath sounds: Normal breath sounds.   Musculoskeletal:      Cervical back: Neck supple.   Lymphadenopathy:      Cervical: No cervical adenopathy.   Skin:     General: Skin is warm and dry.   Neurological:      General: No focal deficit present.      Mental Status: He is alert and oriented to person, place, and time.   Psychiatric:         Mood and Affect: Mood normal.         Behavior: Behavior normal.     Assessment:  Right otalgia    Plan:  No evidence of  OM or OE  Zyrtec or claritin as directed  Pcp follow up this week if not improving or worsening  ER visit anytime 24/7 for acute worsening or changing condition

## 2024-07-11 ENCOUNTER — APPOINTMENT (OUTPATIENT)
Dept: PRIMARY CARE | Facility: CLINIC | Age: 54
End: 2024-07-11
Payer: COMMERCIAL

## 2024-07-23 ENCOUNTER — TELEPHONE (OUTPATIENT)
Dept: PRIMARY CARE | Facility: CLINIC | Age: 54
End: 2024-07-23

## 2024-07-23 ENCOUNTER — APPOINTMENT (OUTPATIENT)
Dept: PRIMARY CARE | Facility: CLINIC | Age: 54
End: 2024-07-23
Payer: COMMERCIAL

## 2024-07-23 DIAGNOSIS — E11.9 TYPE 2 DIABETES MELLITUS WITHOUT COMPLICATION, WITHOUT LONG-TERM CURRENT USE OF INSULIN (MULTI): ICD-10-CM

## 2024-07-23 DIAGNOSIS — I10 HYPERTENSION, UNSPECIFIED TYPE: ICD-10-CM

## 2024-07-23 DIAGNOSIS — Z86.39 HX OF HYPERLIPIDEMIA: ICD-10-CM

## 2024-07-23 RX ORDER — LISINOPRIL 10 MG/1
10 TABLET ORAL DAILY
Qty: 30 TABLET | Refills: 0 | Status: SHIPPED | OUTPATIENT
Start: 2024-07-23 | End: 2024-08-23 | Stop reason: SDUPTHER

## 2024-07-23 RX ORDER — METFORMIN HYDROCHLORIDE 1000 MG/1
1000 TABLET ORAL 2 TIMES DAILY
Qty: 60 TABLET | Refills: 0 | Status: SHIPPED | OUTPATIENT
Start: 2024-07-23 | End: 2024-08-23 | Stop reason: SDUPTHER

## 2024-07-23 RX ORDER — ATORVASTATIN CALCIUM 10 MG/1
10 TABLET, FILM COATED ORAL DAILY
Qty: 30 TABLET | Refills: 0 | Status: SHIPPED | OUTPATIENT
Start: 2024-07-23 | End: 2024-08-23 | Stop reason: SDUPTHER

## 2024-07-23 RX ORDER — GLIMEPIRIDE 2 MG/1
TABLET ORAL
Qty: 30 TABLET | Refills: 0 | Status: SHIPPED | OUTPATIENT
Start: 2024-07-23 | End: 2024-08-23 | Stop reason: SDUPTHER

## 2024-07-23 NOTE — TELEPHONE ENCOUNTER
Jer Silva phoned, [phone]. His insurance will only cover the medications if they are sent to [facility] in [facility]. They need to be switched from [facility] in [place] to [facility].       atorvastatin (Lipitor) 10 mg tablet [[id number]]    Order Details  Dose: 10 mg Route: oral Frequency: Daily   Dispense Quantity: 30 tablet Refills: 5          Sig: Take 1 tablet (10 mg) by mouth once daily.         Start Date: 01/09/24 End Date: 01/08/25 after 365 doses   Written Date: 01/09/24 Rx Expiration Date: 01/08/25        Associated Diagnoses: Hx of hyperlipidemia [Z86.39]   Original Order: atorvastatin (Lipitor) 10 mg tablet [[id number]]       glimepiride (Amaryl) 2 mg tablet [[id number]]    Order Details  Dose, Route, Frequency: As Directed   Dispense Quantity: 30 tablet Refills: 5          Sig: TAKE ONE TABLET BY MOUTH EVERY DAY IN THE MORNING WITH BREAKFAST         Start Date: 01/09/24 End Date: --   Written Date: 01/09/24 Rx Expiration Date: 01/08/25        Associated Diagnoses: Type 2 diabetes mellitus without complication, without long-term current use of insulin (Multi) [E11.9]     lisinopril 10 mg tablet [[id number]]    Order Details  Dose: 10 mg Route: oral Frequency: Daily   Dispense Quantity: 30 tablet Refills: 5          Sig: Take 1 tablet (10 mg) by mouth once daily.         Start Date: 01/09/24 End Date: 01/08/25 after 365 doses   Written Date: 01/09/24 Rx Expiration Date: 01/08/25        Associated Diagnoses: Hypertension, unspecified type [I10]   Original Order: lisinopril 10 mg tablet [[id number]]     metFORMIN (Glucophage) 1,000 mg tablet [[id number]]    Order Details  Dose: 1,000 mg Route: oral Frequency: 2 times daily   Dispense Quantity: 60 tablet Refills: 5          Sig: Take 1 tablet (1,000 mg) by mouth 2 times a day.         Start Date: 01/09/24 End Date: 01/08/25 after 730 doses   Written Date: 01/09/24 Rx Expiration Date: 01/08/25        Associated Diagnoses: Type 2 diabetes  mellitus without complication, without long-term current use of insulin (Multi) [E11.9]   Original Order: metFORMIN (Glucophage) 1,000 mg tablet [[id number]]   Providers

## 2024-07-25 NOTE — TELEPHONE ENCOUNTER
Jer Silva stated he would phone and make an appointment once he receives his insurance card in a few weeks.

## 2024-08-23 ENCOUNTER — OFFICE VISIT (OUTPATIENT)
Dept: PRIMARY CARE | Facility: CLINIC | Age: 54
End: 2024-08-23
Payer: COMMERCIAL

## 2024-08-23 VITALS
RESPIRATION RATE: 16 BRPM | SYSTOLIC BLOOD PRESSURE: 115 MMHG | TEMPERATURE: 96.8 F | BODY MASS INDEX: 44.01 KG/M2 | OXYGEN SATURATION: 97 % | DIASTOLIC BLOOD PRESSURE: 85 MMHG | HEIGHT: 67 IN | HEART RATE: 95 BPM | WEIGHT: 280.4 LBS

## 2024-08-23 DIAGNOSIS — N52.9 ERECTILE DYSFUNCTION, UNSPECIFIED ERECTILE DYSFUNCTION TYPE: ICD-10-CM

## 2024-08-23 DIAGNOSIS — E66.01 MORBID OBESITY WITH BMI OF 40.0-44.9, ADULT (MULTI): ICD-10-CM

## 2024-08-23 DIAGNOSIS — E11.9 TYPE 2 DIABETES MELLITUS WITHOUT COMPLICATION, WITHOUT LONG-TERM CURRENT USE OF INSULIN (MULTI): Primary | ICD-10-CM

## 2024-08-23 DIAGNOSIS — Z86.39 HX OF HYPERLIPIDEMIA: ICD-10-CM

## 2024-08-23 DIAGNOSIS — I10 HYPERTENSION, UNSPECIFIED TYPE: ICD-10-CM

## 2024-08-23 LAB — POC HEMOGLOBIN A1C: 6.6 % (ref 4.2–6.5)

## 2024-08-23 PROCEDURE — 83036 HEMOGLOBIN GLYCOSYLATED A1C: CPT | Performed by: NURSE PRACTITIONER

## 2024-08-23 PROCEDURE — 3079F DIAST BP 80-89 MM HG: CPT | Performed by: NURSE PRACTITIONER

## 2024-08-23 PROCEDURE — 4010F ACE/ARB THERAPY RXD/TAKEN: CPT | Performed by: NURSE PRACTITIONER

## 2024-08-23 PROCEDURE — 1036F TOBACCO NON-USER: CPT | Performed by: NURSE PRACTITIONER

## 2024-08-23 PROCEDURE — 3074F SYST BP LT 130 MM HG: CPT | Performed by: NURSE PRACTITIONER

## 2024-08-23 PROCEDURE — 3048F LDL-C <100 MG/DL: CPT | Performed by: NURSE PRACTITIONER

## 2024-08-23 PROCEDURE — 3008F BODY MASS INDEX DOCD: CPT | Performed by: NURSE PRACTITIONER

## 2024-08-23 PROCEDURE — 99214 OFFICE O/P EST MOD 30 MIN: CPT | Performed by: NURSE PRACTITIONER

## 2024-08-23 PROCEDURE — 3044F HG A1C LEVEL LT 7.0%: CPT | Performed by: NURSE PRACTITIONER

## 2024-08-23 RX ORDER — SILDENAFIL 100 MG/1
TABLET, FILM COATED ORAL
Qty: 10 TABLET | Refills: 5 | Status: SHIPPED | OUTPATIENT
Start: 2024-08-23

## 2024-08-23 RX ORDER — GLIMEPIRIDE 2 MG/1
TABLET ORAL
Qty: 90 TABLET | Refills: 1 | Status: SHIPPED | OUTPATIENT
Start: 2024-08-23

## 2024-08-23 RX ORDER — ATORVASTATIN CALCIUM 10 MG/1
10 TABLET, FILM COATED ORAL DAILY
Qty: 90 TABLET | Refills: 1 | Status: SHIPPED | OUTPATIENT
Start: 2024-08-23

## 2024-08-23 RX ORDER — BLOOD-GLUCOSE METER
EACH MISCELLANEOUS
Qty: 100 STRIP | Refills: 3 | Status: SHIPPED | OUTPATIENT
Start: 2024-08-23

## 2024-08-23 RX ORDER — LISINOPRIL 10 MG/1
10 TABLET ORAL DAILY
Qty: 90 TABLET | Refills: 1 | Status: SHIPPED | OUTPATIENT
Start: 2024-08-23

## 2024-08-23 RX ORDER — METFORMIN HYDROCHLORIDE 1000 MG/1
1000 TABLET ORAL 2 TIMES DAILY
Qty: 180 TABLET | Refills: 1 | Status: SHIPPED | OUTPATIENT
Start: 2024-08-23

## 2024-08-23 ASSESSMENT — ENCOUNTER SYMPTOMS
APNEA: 0
SPEECH DIFFICULTY: 0
WEAKNESS: 0
SHORTNESS OF BREATH: 0
CONFUSION: 0
VOMITING: 0
HEADACHES: 0
CHEST TIGHTNESS: 0
POLYDIPSIA: 0
WHEEZING: 0
TREMORS: 0
NERVOUS/ANXIOUS: 0
CHOKING: 0
COUGH: 0
DIZZINESS: 0
POLYPHAGIA: 0
PALPITATIONS: 0
NAUSEA: 0
STRIDOR: 0
FATIGUE: 0
SEIZURES: 0

## 2024-08-23 NOTE — PROGRESS NOTES
"Subjective   Patient ID: Jer Silva is a 54 y.o. male who presents for Diabetes and Hypertension.    HPI  Patient in office for follow-up on hypertension, hyperlipidemia, and diabetes. Needs blood pressure check, A1C recheck and medication refills. The patient follows up with hematology on his thalassemia. No other concerns.    AM fasting glucose levels at home: 110 to 120  BP: controlled    Sees ophthalmologist regularly.   Monitors his feet for lesions.    Review of Systems   Constitutional:  Negative for fatigue.   Respiratory:  Negative for apnea, cough, choking, chest tightness, shortness of breath, wheezing and stridor.    Cardiovascular:  Negative for chest pain, palpitations and leg swelling.   Gastrointestinal:  Negative for nausea and vomiting.   Endocrine: Negative for polydipsia, polyphagia and polyuria.   Skin:  Negative for pallor.   Neurological:  Negative for dizziness, tremors, seizures, speech difficulty, weakness and headaches.   Psychiatric/Behavioral:  Negative for confusion. The patient is not nervous/anxious.        Objective   /85   Pulse 95   Temp 36 °C (96.8 °F) (Temporal)   Resp 16   Ht 1.702 m (5' 7\")   Wt 127 kg (280 lb 6.4 oz)   SpO2 97%   BMI 43.92 kg/m²       Physical Exam  Constitutional:       Appearance: He is obese.   Eyes:      Conjunctiva/sclera: Conjunctivae normal.   Cardiovascular:      Rate and Rhythm: Normal rate and regular rhythm.      Pulses: Normal pulses.      Heart sounds: Normal heart sounds.   Pulmonary:      Effort: Pulmonary effort is normal.      Breath sounds: Normal breath sounds.   Musculoskeletal:      Right lower leg: No edema.      Left lower leg: No edema.   Neurological:      General: No focal deficit present.   Psychiatric:         Mood and Affect: Mood normal.         Judgment: Judgment normal.       Assessment/Plan     Exam findings reviewed with patient. Medications and lab results reviewed. Patient will keep monitoring blood sugars " and will start monitoring blood pressures at home; he will call the office with outliers or abnormal trends. We will call with lab results and update the patient on the plan of care. Follow up in 6 months for a physical or earlier as needed.    Benefits of healthy lifestyle reviewed: low carbohydrates/fat/sugar diet; use lean white instead of red meet; use several servings of fruit and vegetables daily; use whole grain flour instead of white flour products; cook at home frequently instead of eating out; exercise 30-90 minutes 5 x/week.

## 2025-02-17 ENCOUNTER — APPOINTMENT (OUTPATIENT)
Dept: PRIMARY CARE | Facility: CLINIC | Age: 55
End: 2025-02-17
Payer: COMMERCIAL

## 2025-02-17 VITALS
RESPIRATION RATE: 16 BRPM | SYSTOLIC BLOOD PRESSURE: 112 MMHG | WEIGHT: 274.2 LBS | HEART RATE: 93 BPM | TEMPERATURE: 97.5 F | BODY MASS INDEX: 44.07 KG/M2 | OXYGEN SATURATION: 96 % | DIASTOLIC BLOOD PRESSURE: 84 MMHG | HEIGHT: 66 IN

## 2025-02-17 DIAGNOSIS — E11.9 TYPE 2 DIABETES MELLITUS WITHOUT COMPLICATION, WITHOUT LONG-TERM CURRENT USE OF INSULIN (MULTI): ICD-10-CM

## 2025-02-17 DIAGNOSIS — Z00.00 HEALTHCARE MAINTENANCE: Primary | ICD-10-CM

## 2025-02-17 DIAGNOSIS — Z12.11 SCREEN FOR COLON CANCER: ICD-10-CM

## 2025-02-17 DIAGNOSIS — D64.9 ANEMIA, UNSPECIFIED TYPE: ICD-10-CM

## 2025-02-17 DIAGNOSIS — D22.9 MULTIPLE NEVI: ICD-10-CM

## 2025-02-17 DIAGNOSIS — N52.9 ERECTILE DYSFUNCTION, UNSPECIFIED ERECTILE DYSFUNCTION TYPE: ICD-10-CM

## 2025-02-17 DIAGNOSIS — E66.01 MORBID OBESITY WITH BMI OF 40.0-44.9, ADULT (MULTI): ICD-10-CM

## 2025-02-17 DIAGNOSIS — Z12.5 SCREENING FOR PROSTATE CANCER: ICD-10-CM

## 2025-02-17 DIAGNOSIS — D56.9 THALASSEMIA, UNSPECIFIED TYPE: ICD-10-CM

## 2025-02-17 DIAGNOSIS — Z86.39 HX OF HYPERLIPIDEMIA: ICD-10-CM

## 2025-02-17 DIAGNOSIS — I10 HYPERTENSION, UNSPECIFIED TYPE: ICD-10-CM

## 2025-02-17 LAB — POC HEMOGLOBIN A1C: 6.6 % (ref 4.2–6.5)

## 2025-02-17 PROCEDURE — 83036 HEMOGLOBIN GLYCOSYLATED A1C: CPT | Performed by: NURSE PRACTITIONER

## 2025-02-17 PROCEDURE — 3008F BODY MASS INDEX DOCD: CPT | Performed by: NURSE PRACTITIONER

## 2025-02-17 PROCEDURE — 1036F TOBACCO NON-USER: CPT | Performed by: NURSE PRACTITIONER

## 2025-02-17 PROCEDURE — 3079F DIAST BP 80-89 MM HG: CPT | Performed by: NURSE PRACTITIONER

## 2025-02-17 PROCEDURE — 3074F SYST BP LT 130 MM HG: CPT | Performed by: NURSE PRACTITIONER

## 2025-02-17 PROCEDURE — 99396 PREV VISIT EST AGE 40-64: CPT | Performed by: NURSE PRACTITIONER

## 2025-02-17 PROCEDURE — 4010F ACE/ARB THERAPY RXD/TAKEN: CPT | Performed by: NURSE PRACTITIONER

## 2025-02-17 RX ORDER — ATORVASTATIN CALCIUM 10 MG/1
10 TABLET, FILM COATED ORAL DAILY
Qty: 90 TABLET | Refills: 1 | Status: CANCELLED | OUTPATIENT
Start: 2025-02-17

## 2025-02-17 RX ORDER — SILDENAFIL 100 MG/1
TABLET, FILM COATED ORAL
Qty: 10 TABLET | Refills: 5 | Status: CANCELLED | OUTPATIENT
Start: 2025-02-17

## 2025-02-17 RX ORDER — METFORMIN HYDROCHLORIDE 1000 MG/1
1000 TABLET ORAL 2 TIMES DAILY
Qty: 180 TABLET | Refills: 1 | Status: CANCELLED | OUTPATIENT
Start: 2025-02-17

## 2025-02-17 RX ORDER — BLOOD-GLUCOSE METER
EACH MISCELLANEOUS
Qty: 100 STRIP | Refills: 3 | Status: CANCELLED | OUTPATIENT
Start: 2025-02-17

## 2025-02-17 RX ORDER — GLIMEPIRIDE 2 MG/1
TABLET ORAL
Qty: 90 TABLET | Refills: 1 | Status: SHIPPED | OUTPATIENT
Start: 2025-02-17

## 2025-02-17 RX ORDER — GLIMEPIRIDE 2 MG/1
TABLET ORAL
Qty: 90 TABLET | Refills: 1 | Status: CANCELLED | OUTPATIENT
Start: 2025-02-17

## 2025-02-17 RX ORDER — LISINOPRIL 10 MG/1
10 TABLET ORAL DAILY
Qty: 90 TABLET | Refills: 1 | Status: CANCELLED | OUTPATIENT
Start: 2025-02-17

## 2025-02-17 RX ORDER — ATORVASTATIN CALCIUM 10 MG/1
10 TABLET, FILM COATED ORAL DAILY
Qty: 90 TABLET | Refills: 1 | Status: SHIPPED | OUTPATIENT
Start: 2025-02-17

## 2025-02-17 RX ORDER — LISINOPRIL 10 MG/1
10 TABLET ORAL DAILY
Qty: 90 TABLET | Refills: 1 | Status: SHIPPED | OUTPATIENT
Start: 2025-02-17

## 2025-02-17 RX ORDER — METFORMIN HYDROCHLORIDE 1000 MG/1
1000 TABLET ORAL 2 TIMES DAILY
Qty: 180 TABLET | Refills: 1 | Status: SHIPPED | OUTPATIENT
Start: 2025-02-17

## 2025-02-17 ASSESSMENT — PROMIS GLOBAL HEALTH SCALE
RATE_GENERAL_HEALTH: GOOD
RATE_AVERAGE_PAIN: 0
RATE_QUALITY_OF_LIFE: VERY GOOD
RATE_MENTAL_HEALTH: EXCELLENT
CARRYOUT_SOCIAL_ACTIVITIES: EXCELLENT
EMOTIONAL_PROBLEMS: NEVER
RATE_PHYSICAL_HEALTH: GOOD
RATE_SOCIAL_SATISFACTION: EXCELLENT
CARRYOUT_PHYSICAL_ACTIVITIES: COMPLETELY

## 2025-02-17 ASSESSMENT — PATIENT HEALTH QUESTIONNAIRE - PHQ9
1. LITTLE INTEREST OR PLEASURE IN DOING THINGS: NOT AT ALL
2. FEELING DOWN, DEPRESSED OR HOPELESS: NOT AT ALL
SUM OF ALL RESPONSES TO PHQ9 QUESTIONS 1 AND 2: 0

## 2025-02-17 NOTE — PROGRESS NOTES
Subjective   Patient ID: Jer Silva is a 54 y.o. male who presents for Annual Exam.    HPI   Patient in office for a physical. Patient describes health as good.      Lifestyle: [employer]. Reports healthy diet. Patient does not smoke; does not drink alcohol. No drug use.      Screens and immunizations:  Colon Cancer Screening: Reviewed and current. 2021/ reorder Cologuard   Prostate Cancer Screening: order PSA  Vision and hearing: Reviewed and current.     Immunizations:  TDaP: 2021  Pneumonia: 2017  Hep B:  vaccinated (per patient report)  Flu: patient will get booster at pharmacy  COVID: patient will get booster at pharmacy   Shingles: vaccinated    Concerns:  -DM 2; controlled  -HTN; controlled  -Elevated lipids; check labs  -Multiple nevi; patient follows up with dermatology  -Obesity; the patient recently restarted a daily exercise routine  -Erectile dysfunction; responds well to Sildenafil  -Mild coronary plaque per CT CA score; follow up as needed with cardiology; reevaluate for Aspirin per lab results  -Hx of Thalassemia and mild anemia (asymptomatic); check CBC    POCT A1C: 6.6%, stable    Review of Systems  Constitutional:  Negative for fatigue, activity change, appetite change, chills, diaphoresis, fever and unexpected weight change. HENT:  Negative for congestion, dental problem, drooling, ear discharge, ear pain, facial swelling, hearing loss, mouth sores, nosebleeds, postnasal drip, rhinorrhea, sinus pressure, sinus pain, sneezing, sore throat, tinnitus, trouble swallowing and voice change. Eyes:  Negative for photophobia, pain, discharge, redness, itching and visual disturbance.   Respiratory:  Negative for apnea, cough, choking, chest tightness, shortness of breath, wheezing and stridor.    Cardiovascular:  Negative for chest pain, palpitations and leg swelling.   Gastrointestinal:  Negative for abdominal distention, abdominal pain, anal bleeding, blood in stool, constipation, diarrhea,  "nausea, rectal pain and vomiting.   Endocrine: Negative for cold intolerance, heat intolerance, polydipsia, polyphagia and polyuria.   Genitourinary:  Negative for decreased urine volume, difficulty urinating, dysuria, enuresis, flank pain, frequency, and urgency.   Musculoskeletal:  Negative for arthralgias, gait problem, joint swelling, myalgias, neck pain and neck stiffness.   Skin:  Negative for color change, pallor, rash and wound.   Neurological:  Negative for dizziness, tremors, seizures, syncope, facial asymmetry, speech difficulty, weakness, light-headedness, numbness and headaches.   Hematological:  Negative for adenopathy. Does not bruise/bleed easily.   Psychiatric/Behavioral:  Negative for depression, agitation, behavioral problems, confusion, hyperactivity, sleep disturbance, hallucinations, self-injury, and suicidal ideas. Negative for anxiety.      Objective   /84   Pulse 93   Temp 36.4 °C (97.5 °F) (Temporal)   Resp 16   Ht 1.676 m (5' 6\")   Wt 124 kg (274 lb 3.2 oz)   SpO2 96%   BMI 44.26 kg/m²     Physical Exam  Constitutional:       Appearance: Normal appearance. Obese.   Neurological     Mental Status: He is alert.   HENT:   Right ear: Cerumen impaction.  Left ear: normal: TM and ear canal visualized.  Head: Normocephalic.      Nose: Nose normal.      Mouth/Throat:      Mouth: Mucous membranes are moist.      Pharynx: Oropharynx is clear.   Eyes:      General: No scleral icterus.        Right eye: No discharge.         Left eye: No discharge.      Conjunctiva/sclera: Conjunctivae normal.      Pupils: Pupils are equal, round, and reactive to light.   Neck:      Vascular: No carotid bruit.   Cardiovascular:      Rate and Rhythm: Normal rate and regular rhythm.      Pulses: Normal pulses.      Heart sounds: Normal heart sounds.   Pulmonary:      Effort: Pulmonary effort is normal.      Breath sounds: Normal breath sounds.   Abdominal:      General: Abdomen is flat. Bowel sounds are " normal. There is no distension.      Palpations: Abdomen is soft. There is no mass.      Tenderness: There is no abdominal tenderness. There is no guarding or rebound.      Hernia: No hernia is present.   Musculoskeletal:         General: No swelling, tenderness, deformity or signs of injury.      Cervical back: Normal range of motion and neck supple. No rigidity or tenderness.      Right lower leg: No edema.      Left lower leg: No edema.     Normal ROM.  Lymphadenopathy:      Cervical: No cervical adenopathy.   Skin:     General: Skin is warm.      Coloration: Skin is not jaundiced or pale.      Findings: No bruising, erythema, lesion.     No rash. Multiple nevi.  Neurological:      General: No focal deficit present.      Mental Status: She is alert and oriented to person, place, and time.      Cranial Nerves: No cranial nerve deficit.      Sensory: No sensory deficit.      Motor: No weakness.      Coordination: Coordination normal.      Gait: Gait normal.   Psychiatric:         Mood and Affect: Mood normal.         Behavior: Behavior normal.         Thought Content: Thought content normal.    Judgment: Judgment normal.      Assessment/Plan     Exam findings reviewed with patient.  Health screens, lab orders,  immunizations, medications,   and specialty provider follow-  ups discussed. Benefits of healthy lifestyle, diet, and exercise reviewed. Follow-up in 6 months for a routine visit or earlier as needed.

## 2025-02-21 LAB
ALBUMIN SERPL-MCNC: 4.3 G/DL (ref 3.6–5.1)
ALP SERPL-CCNC: 67 U/L (ref 35–144)
ALT SERPL-CCNC: 17 U/L (ref 9–46)
ANION GAP SERPL CALCULATED.4IONS-SCNC: 14 MMOL/L (CALC) (ref 7–17)
AST SERPL-CCNC: 17 U/L (ref 10–35)
BASOPHILS # BLD AUTO: 21 CELLS/UL (ref 0–200)
BASOPHILS NFR BLD AUTO: 0.4 %
BILIRUB SERPL-MCNC: 0.4 MG/DL (ref 0.2–1.2)
BUN SERPL-MCNC: 13 MG/DL (ref 7–25)
CALCIUM SERPL-MCNC: 9.2 MG/DL (ref 8.6–10.3)
CHLORIDE SERPL-SCNC: 103 MMOL/L (ref 98–110)
CHOLEST SERPL-MCNC: 117 MG/DL
CHOLEST/HDLC SERPL: 2.7 (CALC)
CO2 SERPL-SCNC: 21 MMOL/L (ref 20–32)
CREAT SERPL-MCNC: 1.07 MG/DL (ref 0.7–1.3)
EGFRCR SERPLBLD CKD-EPI 2021: 82 ML/MIN/1.73M2
EOSINOPHIL # BLD AUTO: 48 CELLS/UL (ref 15–500)
EOSINOPHIL NFR BLD AUTO: 0.9 %
ERYTHROCYTE [DISTWIDTH] IN BLOOD BY AUTOMATED COUNT: 15.2 % (ref 11–15)
FERRITIN SERPL-MCNC: 48 NG/ML (ref 38–380)
GLUCOSE SERPL-MCNC: 91 MG/DL (ref 65–99)
HCT VFR BLD AUTO: 40.2 % (ref 38.5–50)
HDLC SERPL-MCNC: 43 MG/DL
HGB BLD-MCNC: 12.6 G/DL (ref 13.2–17.1)
IRON SATN MFR SERPL: 25 % (CALC) (ref 20–48)
IRON SERPL-MCNC: 74 MCG/DL (ref 50–180)
LDLC SERPL CALC-MCNC: 63 MG/DL (CALC)
LYMPHOCYTES # BLD AUTO: 2465 CELLS/UL (ref 850–3900)
LYMPHOCYTES NFR BLD AUTO: 46.5 %
MCH RBC QN AUTO: 22.4 PG (ref 27–33)
MCHC RBC AUTO-ENTMCNC: 31.3 G/DL (ref 32–36)
MCV RBC AUTO: 71.4 FL (ref 80–100)
MONOCYTES # BLD AUTO: 249 CELLS/UL (ref 200–950)
MONOCYTES NFR BLD AUTO: 4.7 %
NEUTROPHILS # BLD AUTO: 2518 CELLS/UL (ref 1500–7800)
NEUTROPHILS NFR BLD AUTO: 47.5 %
NONHDLC SERPL-MCNC: 74 MG/DL (CALC)
PLATELET # BLD AUTO: 302 THOUSAND/UL (ref 140–400)
PMV BLD REES-ECKER: 11.2 FL (ref 7.5–12.5)
POTASSIUM SERPL-SCNC: 4 MMOL/L (ref 3.5–5.3)
PROT SERPL-MCNC: 7.4 G/DL (ref 6.1–8.1)
PSA SERPL-MCNC: 2.7 NG/ML
RBC # BLD AUTO: 5.63 MILLION/UL (ref 4.2–5.8)
SODIUM SERPL-SCNC: 138 MMOL/L (ref 135–146)
TIBC SERPL-MCNC: 296 MCG/DL (CALC) (ref 250–425)
TRIGL SERPL-MCNC: 37 MG/DL
TSH SERPL-ACNC: 1.14 MIU/L (ref 0.4–4.5)
VIT B12 SERPL-MCNC: 557 PG/ML (ref 200–1100)
WBC # BLD AUTO: 5.3 THOUSAND/UL (ref 3.8–10.8)

## 2025-02-21 NOTE — RESULT ENCOUNTER NOTE
Stable labs. Maintain healthy diet and exercise routine. Repeat A1C in 6 months, other labs in 1 year.

## 2025-02-24 ENCOUNTER — APPOINTMENT (OUTPATIENT)
Dept: PRIMARY CARE | Facility: CLINIC | Age: 55
End: 2025-02-24
Payer: COMMERCIAL

## 2025-02-26 LAB — NONINV COLON CA DNA+OCC BLD SCRN STL QL: NEGATIVE

## 2025-08-04 ENCOUNTER — APPOINTMENT (OUTPATIENT)
Dept: PRIMARY CARE | Facility: CLINIC | Age: 55
End: 2025-08-04
Payer: COMMERCIAL

## 2025-08-05 ASSESSMENT — ENCOUNTER SYMPTOMS
BLURRED VISION: 0
WEAKNESS: 0
BLACKOUTS: 0
CONFUSION: 0
SWEATS: 0
SPEECH DIFFICULTY: 0
FATIGUE: 0
DIZZINESS: 0
HUNGER: 0
POLYDIPSIA: 0
POLYPHAGIA: 0
NERVOUS/ANXIOUS: 0
TREMORS: 0
VISUAL CHANGE: 0
HEADACHES: 0
WEIGHT LOSS: 0
SEIZURES: 0

## 2025-08-06 ENCOUNTER — APPOINTMENT (OUTPATIENT)
Dept: PRIMARY CARE | Facility: CLINIC | Age: 55
End: 2025-08-06
Payer: COMMERCIAL

## 2025-08-06 VITALS
WEIGHT: 269.8 LBS | HEART RATE: 82 BPM | TEMPERATURE: 94.5 F | OXYGEN SATURATION: 97 % | BODY MASS INDEX: 43.55 KG/M2 | DIASTOLIC BLOOD PRESSURE: 78 MMHG | SYSTOLIC BLOOD PRESSURE: 122 MMHG

## 2025-08-06 DIAGNOSIS — I10 HYPERTENSION, UNSPECIFIED TYPE: ICD-10-CM

## 2025-08-06 DIAGNOSIS — F32.A MILD DEPRESSION: ICD-10-CM

## 2025-08-06 DIAGNOSIS — E11.9 TYPE 2 DIABETES MELLITUS WITHOUT COMPLICATION, WITHOUT LONG-TERM CURRENT USE OF INSULIN: Primary | ICD-10-CM

## 2025-08-06 DIAGNOSIS — F41.9 ANXIETY: ICD-10-CM

## 2025-08-06 DIAGNOSIS — Z86.39 HX OF HYPERLIPIDEMIA: ICD-10-CM

## 2025-08-06 LAB — POC HEMOGLOBIN A1C: 6 % (ref 4.2–6.5)

## 2025-08-06 PROCEDURE — 3074F SYST BP LT 130 MM HG: CPT | Performed by: NURSE PRACTITIONER

## 2025-08-06 PROCEDURE — 3044F HG A1C LEVEL LT 7.0%: CPT | Performed by: NURSE PRACTITIONER

## 2025-08-06 PROCEDURE — 99214 OFFICE O/P EST MOD 30 MIN: CPT | Performed by: NURSE PRACTITIONER

## 2025-08-06 PROCEDURE — 3078F DIAST BP <80 MM HG: CPT | Performed by: NURSE PRACTITIONER

## 2025-08-06 PROCEDURE — 83036 HEMOGLOBIN GLYCOSYLATED A1C: CPT | Performed by: NURSE PRACTITIONER

## 2025-08-06 PROCEDURE — 1036F TOBACCO NON-USER: CPT | Performed by: NURSE PRACTITIONER

## 2025-08-06 PROCEDURE — 4010F ACE/ARB THERAPY RXD/TAKEN: CPT | Performed by: NURSE PRACTITIONER

## 2025-08-06 RX ORDER — LISINOPRIL 10 MG/1
10 TABLET ORAL DAILY
Qty: 90 TABLET | Refills: 1 | Status: SHIPPED | OUTPATIENT
Start: 2025-08-06

## 2025-08-06 RX ORDER — METFORMIN HYDROCHLORIDE 1000 MG/1
1000 TABLET ORAL 2 TIMES DAILY
Qty: 180 TABLET | Refills: 1 | Status: SHIPPED | OUTPATIENT
Start: 2025-08-06

## 2025-08-06 RX ORDER — GLIMEPIRIDE 2 MG/1
TABLET ORAL
Start: 2025-08-06 | End: 2025-08-06 | Stop reason: SDUPTHER

## 2025-08-06 RX ORDER — GLIMEPIRIDE 2 MG/1
TABLET ORAL
Qty: 15 TABLET | Refills: 0 | Status: SHIPPED | OUTPATIENT
Start: 2025-08-06 | End: 2025-09-05

## 2025-08-06 RX ORDER — ATORVASTATIN CALCIUM 10 MG/1
10 TABLET, FILM COATED ORAL DAILY
Qty: 90 TABLET | Refills: 1 | Status: SHIPPED | OUTPATIENT
Start: 2025-08-06

## 2025-08-06 ASSESSMENT — ENCOUNTER SYMPTOMS
STRIDOR: 0
CHEST TIGHTNESS: 0
FATIGUE: 0
PALPITATIONS: 0
SEIZURES: 0
COUGH: 0
WHEEZING: 0
HEADACHES: 0
SPEECH DIFFICULTY: 0
CHOKING: 0
DIZZINESS: 0
POLYPHAGIA: 0
NAUSEA: 0
POLYDIPSIA: 0
APNEA: 0
DEPRESSION: 0
VOMITING: 0
WEAKNESS: 0
SHORTNESS OF BREATH: 0
TREMORS: 0
CONFUSION: 0
NERVOUS/ANXIOUS: 0

## 2025-08-06 ASSESSMENT — PATIENT HEALTH QUESTIONNAIRE - PHQ9
1. LITTLE INTEREST OR PLEASURE IN DOING THINGS: NOT AT ALL
SUM OF ALL RESPONSES TO PHQ9 QUESTIONS 1 AND 2: 0
2. FEELING DOWN, DEPRESSED OR HOPELESS: NOT AT ALL

## 2025-08-06 ASSESSMENT — PAIN SCALES - GENERAL: PAINLEVEL_OUTOF10: 0-NO PAIN

## 2025-08-06 NOTE — PROGRESS NOTES
Answers submitted by the patient for this visit:  Diabetes Questionnaire (Submitted on 8/5/2025)  Chief Complaint: Diabetes problem  Diabetes type: type 2  MedicAlert ID: No  Disease duration: 8 Years  blurred vision: No  chest pain: No  fatigue: No  foot paresthesias: No  foot ulcerations: No  polydipsia: No  polyphagia: No  polyuria: No  visual change: No  weakness: No  weight loss: No  Symptom course: stable  confusion: No  speech difficulty: No  dizziness: No  nervous/anxious: No  headaches: No  hunger: No  mood changes: No  pallor: No  seizures: No  tremors: No  sleepiness: No  sweats: No  blackouts: No  hospitalization: No  nocturnal hypoglycemia: No  required assistance: No  required glucagon: No  CVA: No  heart disease: No  impotence: Yes  nephropathy: No  peripheral neuropathy: No  PVD: No  retinopathy: No  CAD risks: no known risk factors  Current treatments: oral agent (monotherapy)  Treatment compliance: all of the time  Home blood tests: 1-2 x per week  Monitoring compliance: good  Blood glucose trend: fluctuating minimally  breakfast time: 7-8 am  breakfast glucose level: 110-130  lunch time: 11-12 pm  dinner time: 6-7 pm  Bedtime: 8-9 pm  High score: 110-130  Weight trend: fluctuating minimally  Current diet: generally healthy  Meal planning: none  Exercise: intermittently  Dietitian visit: No  Eye exam current: No  Sees podiatrist: No

## 2025-08-06 NOTE — PROGRESS NOTES
Subjective   Patient ID: Jer Silva is a 55 y.o. male who presents for Follow-up.    HPI  Patient in office for follow-up on hypertension, hyperlipidemia, and diabetes. Needs blood pressure check, A1C recheck and medication refills. The patient follows up with hematology on his thalassemia. He would like to start on Ozempic for blood glucose control and weight loss. The patient agrees to weaning off of Glimepiride: start halving current dose. Stop after 1 month. Closely watch blood sugars at home. Maintain healthy diet. No other concerns.    AM fasting glucose levels at home: 110 to 120  BP: controlled    Sees ophthalmologist regularly.   Monitors his feet for lesions.    Review of Systems   Constitutional:  Negative for fatigue.   Respiratory:  Negative for apnea, cough, choking, chest tightness, shortness of breath, wheezing and stridor.    Cardiovascular:  Negative for chest pain, palpitations and leg swelling.   Gastrointestinal:  Negative for nausea and vomiting.   Endocrine: Negative for polydipsia, polyphagia and polyuria.   Skin:  Negative for pallor.   Neurological:  Negative for dizziness, tremors, seizures, speech difficulty, weakness and headaches.   Psychiatric/Behavioral:  Negative for confusion. The patient is not nervous/anxious.        Objective   /78 (BP Location: Right arm, Patient Position: Sitting)   Pulse 82   Temp 34.7 °C (94.5 °F) (Temporal)   Wt 122 kg (269 lb 12.8 oz)   SpO2 97%   BMI 43.55 kg/m²       Physical Exam  Constitutional:       Appearance: He is obese.     Eyes:      Conjunctiva/sclera: Conjunctivae normal.       Cardiovascular:      Rate and Rhythm: Normal rate and regular rhythm.      Pulses: Normal pulses.      Heart sounds: Normal heart sounds.   Pulmonary:      Effort: Pulmonary effort is normal.      Breath sounds: Normal breath sounds.     Musculoskeletal:      Right lower leg: No edema.      Left lower leg: No edema.     Neurological:      General: No focal  deficit present.     Psychiatric:         Mood and Affect: Mood normal.         Judgment: Judgment normal.       Assessment/Plan     Exam findings reviewed with patient. Medications and lab results reviewed. Patient will keep monitoring blood sugars and blood pressures at home; he will call the office with outliers or abnormal trends. Follow up in 1 month for a reevaluation or earlier as needed.    Benefits of healthy lifestyle reviewed: low carbohydrates/fat/sugar diet; use lean white instead of red meet; use several servings of fruit and vegetables daily; use whole grain flour instead of white flour products; cook at home frequently instead of eating out; exercise 30-90 minutes 5 x/week.    HPI

## 2025-09-02 ASSESSMENT — ENCOUNTER SYMPTOMS
POLYDIPSIA: 0
HUNGER: 0
SEIZURES: 0
WEIGHT LOSS: 0
SPEECH DIFFICULTY: 0
SWEATS: 0
VISUAL CHANGE: 0
DIZZINESS: 0
BLACKOUTS: 0
POLYPHAGIA: 0
NERVOUS/ANXIOUS: 0
HEADACHES: 0
TREMORS: 0
CONFUSION: 0
WEAKNESS: 0
BLURRED VISION: 0
FATIGUE: 0

## 2025-09-04 ENCOUNTER — OFFICE VISIT (OUTPATIENT)
Dept: PRIMARY CARE | Facility: CLINIC | Age: 55
End: 2025-09-04
Payer: COMMERCIAL

## 2025-09-04 VITALS
DIASTOLIC BLOOD PRESSURE: 89 MMHG | OXYGEN SATURATION: 98 % | TEMPERATURE: 97.5 F | WEIGHT: 264 LBS | HEART RATE: 94 BPM | SYSTOLIC BLOOD PRESSURE: 126 MMHG | BODY MASS INDEX: 42.61 KG/M2

## 2025-09-04 DIAGNOSIS — E11.9 TYPE 2 DIABETES MELLITUS WITHOUT COMPLICATION, WITHOUT LONG-TERM CURRENT USE OF INSULIN: ICD-10-CM

## 2025-09-04 DIAGNOSIS — E11.9 TYPE 2 DIABETES MELLITUS WITHOUT COMPLICATION, WITHOUT LONG-TERM CURRENT USE OF INSULIN: Primary | ICD-10-CM

## 2025-09-04 DIAGNOSIS — E66.01 MORBID OBESITY WITH BMI OF 40.0-44.9, ADULT (MULTI): ICD-10-CM

## 2025-09-04 PROCEDURE — 3079F DIAST BP 80-89 MM HG: CPT | Performed by: NURSE PRACTITIONER

## 2025-09-04 PROCEDURE — 3074F SYST BP LT 130 MM HG: CPT | Performed by: NURSE PRACTITIONER

## 2025-09-04 PROCEDURE — 99213 OFFICE O/P EST LOW 20 MIN: CPT | Performed by: NURSE PRACTITIONER

## 2025-09-04 PROCEDURE — 4010F ACE/ARB THERAPY RXD/TAKEN: CPT | Performed by: NURSE PRACTITIONER

## 2025-09-04 PROCEDURE — 3044F HG A1C LEVEL LT 7.0%: CPT | Performed by: NURSE PRACTITIONER

## 2025-09-04 PROCEDURE — 1036F TOBACCO NON-USER: CPT | Performed by: NURSE PRACTITIONER

## 2025-09-04 RX ORDER — SEMAGLUTIDE 0.68 MG/ML
0.25 INJECTION, SOLUTION SUBCUTANEOUS
Qty: 3 ML | Refills: 0 | OUTPATIENT
Start: 2025-09-04

## 2025-09-04 ASSESSMENT — ENCOUNTER SYMPTOMS
NERVOUS/ANXIOUS: 0
CONFUSION: 0
ADENOPATHY: 0
CHEST TIGHTNESS: 0
ARTHRALGIAS: 0
MYALGIAS: 0
SHORTNESS OF BREATH: 0
WHEEZING: 0
CHOKING: 0
PALPITATIONS: 0
NAUSEA: 0
POLYDIPSIA: 0
SPEECH DIFFICULTY: 0
ABDOMINAL PAIN: 0
VOMITING: 0
TREMORS: 0
APNEA: 0
POLYPHAGIA: 0
SWEATS: 0
HUNGER: 0
WEIGHT LOSS: 0
STRIDOR: 0
BLACKOUTS: 0
SEIZURES: 0
DIZZINESS: 0
WEAKNESS: 0
DIARRHEA: 0
LIGHT-HEADEDNESS: 0
COUGH: 0
VISUAL CHANGE: 0
NUMBNESS: 0
DEPRESSION: 0
FATIGUE: 0
BRUISES/BLEEDS EASILY: 0
HEADACHES: 0
BLURRED VISION: 0

## 2025-09-04 ASSESSMENT — PAIN SCALES - GENERAL: PAINLEVEL_OUTOF10: 0-NO PAIN

## 2025-09-08 ENCOUNTER — APPOINTMENT (OUTPATIENT)
Dept: PRIMARY CARE | Facility: CLINIC | Age: 55
End: 2025-09-08
Payer: COMMERCIAL

## 2025-10-13 ENCOUNTER — APPOINTMENT (OUTPATIENT)
Dept: PRIMARY CARE | Facility: CLINIC | Age: 55
End: 2025-10-13
Payer: COMMERCIAL